# Patient Record
Sex: FEMALE | URBAN - METROPOLITAN AREA
[De-identification: names, ages, dates, MRNs, and addresses within clinical notes are randomized per-mention and may not be internally consistent; named-entity substitution may affect disease eponyms.]

---

## 2023-04-24 ENCOUNTER — OFFICE VISIT (OUTPATIENT)
Facility: LOCATION | Age: 73
End: 2023-04-24
Payer: MEDICARE

## 2023-04-24 DIAGNOSIS — H16.223 KERATOCONJUNCT SICCA, NOT SPECIFIED AS SJOGREN'S, BILATERAL: ICD-10-CM

## 2023-04-24 DIAGNOSIS — G43.109 MIGRAINE WITH AURA, NOT INTRACTABLE, W/O STATUS MIGRAINOSUS: Primary | ICD-10-CM

## 2023-04-24 DIAGNOSIS — H04.123 DRY EYE SYNDROME OF BILATERAL LACRIMAL GLANDS: ICD-10-CM

## 2023-04-24 PROCEDURE — 99214 OFFICE O/P EST MOD 30 MIN: CPT | Performed by: OPHTHALMOLOGY

## 2023-04-24 RX ORDER — ERGOCALCIFEROL 1.25 MG/1
CAPSULE ORAL
Refills: 0 | Status: ACTIVE
Start: 2023-04-24

## 2023-04-24 RX ORDER — UBIDECARENONE 50 MG
50 MG CAPSULE ORAL
Refills: 0 | Status: ACTIVE
Start: 2023-04-24

## 2023-04-24 RX ORDER — ASPIRIN 81 MG 81 MG/1
81 MG TABLET ORAL
Refills: 0 | Status: ACTIVE
Start: 2023-04-24

## 2023-04-24 ASSESSMENT — INTRAOCULAR PRESSURE
OS: 19
OD: 17

## 2023-04-24 NOTE — IMPRESSION/PLAN
Impression: Patient has migraine  type headaches. Patient indicates migraines have improved. Patient reports she had a migraine 2 months ago that lasted for 20 minutes. Plan: Patient to Continue to follow up with  Neurologist Dr. Justino Koch D.O to monitor migraines.

## 2023-04-24 NOTE — IMPRESSION/PLAN
Impression: Examination revealed dry eye syndrome secondary to tear deficiencies. Patient indicates she has noticed that when she uses Refresh gtts she does not notice any improvement. Plan: Patient to Continue [place] tears plus  Q2-4 HRS OU, Patient occasionally uses Hot Compresses and continue to use Omega 3's vitamins. RTC in 1 year  for re-evaluation with Dr. Adrienne Morrison. DFE exam and Lipiview.

## 2024-04-29 ENCOUNTER — OFFICE VISIT (OUTPATIENT)
Facility: LOCATION | Age: 74
End: 2024-04-29
Payer: MEDICARE

## 2024-04-29 DIAGNOSIS — H04.123 DRY EYE SYNDROME OF BILATERAL LACRIMAL GLANDS: Primary | ICD-10-CM

## 2024-04-29 DIAGNOSIS — H25.13 AGE-RELATED NUCLEAR CATARACT, BILATERAL: ICD-10-CM

## 2024-04-29 DIAGNOSIS — H16.223 KERATOCONJUNCT SICCA, NOT SPECIFIED AS SJOGREN'S, BILATERAL: ICD-10-CM

## 2024-04-29 DIAGNOSIS — G43.109 MIGRAINE WITH AURA, NOT INTRACTABLE, W/O STATUS MIGRAINOSUS: ICD-10-CM

## 2024-04-29 PROCEDURE — 99214 OFFICE O/P EST MOD 30 MIN: CPT | Performed by: OPHTHALMOLOGY

## 2024-04-29 ASSESSMENT — INTRAOCULAR PRESSURE
OS: 19
OD: 14

## 2025-02-12 ENCOUNTER — OFFICE VISIT (OUTPATIENT)
Facility: LOCATION | Age: 75
End: 2025-02-12
Payer: MEDICARE

## 2025-02-12 DIAGNOSIS — H16.223 KERATOCONJUNCT SICCA, NOT SPECIFIED AS SJOGREN'S, BILATERAL: ICD-10-CM

## 2025-02-12 DIAGNOSIS — H25.13 AGE-RELATED NUCLEAR CATARACT, BILATERAL: ICD-10-CM

## 2025-02-12 DIAGNOSIS — H04.123 DRY EYE SYNDROME OF BILATERAL LACRIMAL GLANDS: Primary | ICD-10-CM

## 2025-02-12 PROCEDURE — 99213 OFFICE O/P EST LOW 20 MIN: CPT | Performed by: OPHTHALMOLOGY

## 2025-02-12 RX ORDER — CYCLOSPORINE OPHTHALMIC SOLUTION 1 MG/ML
0.1 % SOLUTION/ DROPS OPHTHALMIC
Qty: 2 | Refills: 10 | Status: ACTIVE
Start: 2025-02-12

## 2025-02-12 ASSESSMENT — INTRAOCULAR PRESSURE
OS: 18
OD: 16

## 2025-06-18 ENCOUNTER — OFFICE VISIT (OUTPATIENT)
Facility: LOCATION | Age: 75
End: 2025-06-18
Payer: MEDICARE

## 2025-06-18 DIAGNOSIS — Z98.890 OTHER SPECIFIED POSTPROCEDURAL STATE: ICD-10-CM

## 2025-06-18 DIAGNOSIS — H16.223 KERATOCONJUNCT SICCA, NOT SPECIFIED AS SJOGREN'S, BILATERAL: ICD-10-CM

## 2025-06-18 DIAGNOSIS — H04.123 DRY EYE SYNDROME OF BILATERAL LACRIMAL GLANDS: Primary | ICD-10-CM

## 2025-06-18 DIAGNOSIS — H25.13 AGE-RELATED NUCLEAR CATARACT, BILATERAL: ICD-10-CM

## 2025-06-18 PROCEDURE — 99214 OFFICE O/P EST MOD 30 MIN: CPT | Performed by: OPHTHALMOLOGY

## 2025-06-18 ASSESSMENT — INTRAOCULAR PRESSURE
OD: 17
OS: 20

## 2025-06-18 ASSESSMENT — VISUAL ACUITY
OS: 20/25
OD: 20/25